# Patient Record
Sex: MALE | Race: WHITE | NOT HISPANIC OR LATINO | Employment: FULL TIME | URBAN - METROPOLITAN AREA
[De-identification: names, ages, dates, MRNs, and addresses within clinical notes are randomized per-mention and may not be internally consistent; named-entity substitution may affect disease eponyms.]

---

## 2021-03-23 ENCOUNTER — HOSPITAL ENCOUNTER (OUTPATIENT)
Dept: OTHER | Facility: HOSPITAL | Age: 51
Discharge: HOME | End: 2021-03-23
Attending: FAMILY MEDICINE

## 2021-03-23 ENCOUNTER — OFFICE VISIT CONVERTED (OUTPATIENT)
Dept: FAMILY MEDICINE CLINIC | Age: 51
End: 2021-03-23
Attending: FAMILY MEDICINE

## 2021-03-23 LAB
ALBUMIN SERPL-MCNC: 4.3 G/DL (ref 3.5–5)
ALBUMIN/GLOB SERPL: 1.5 {RATIO} (ref 1.4–2.6)
ALP SERPL-CCNC: 92 U/L (ref 53–128)
ALT SERPL-CCNC: 13 U/L (ref 10–40)
ANION GAP SERPL CALC-SCNC: 18 MMOL/L (ref 8–19)
APPEARANCE UR: CLEAR
AST SERPL-CCNC: 13 U/L (ref 15–50)
BACTERIA UR QL AUTO: ABNORMAL
BASOPHILS # BLD MANUAL: 0.05 10*3/UL (ref 0–0.2)
BASOPHILS NFR BLD MANUAL: 0.6 % (ref 0–3)
BILIRUB SERPL-MCNC: 0.18 MG/DL (ref 0.2–1.3)
BILIRUB UR QL: NEGATIVE
BUN SERPL-MCNC: 13 MG/DL (ref 5–25)
BUN/CREAT SERPL: 12 {RATIO} (ref 6–20)
CALCIUM SERPL-MCNC: 9.1 MG/DL (ref 8.7–10.4)
CASTS URNS QL MICRO: ABNORMAL /[LPF]
CHLORIDE SERPL-SCNC: 104 MMOL/L (ref 99–111)
CHOLEST SERPL-MCNC: 264 MG/DL (ref 107–200)
CHOLEST/HDLC SERPL: 7.1 {RATIO} (ref 3–6)
COLOR UR: YELLOW
CONV CO2: 24 MMOL/L (ref 22–32)
CONV LEUKOCYTE ESTERASE: NEGATIVE
CONV TOTAL PROTEIN: 7.1 G/DL (ref 6.3–8.2)
CONV UROBILINOGEN IN URINE BY AUTOMATED TEST STRIP: 0.2 {EHRLICHU}/DL (ref 0.1–1)
CREAT UR-MCNC: 1.13 MG/DL (ref 0.7–1.2)
DEPRECATED RDW RBC AUTO: 43.7 FL
EOSINOPHIL # BLD MANUAL: 0.17 10*3/UL (ref 0–0.7)
EOSINOPHIL NFR BLD MANUAL: 2.1 % (ref 0–7)
EPI CELLS #/AREA URNS HPF: ABNORMAL /[HPF]
ERYTHROCYTE [DISTWIDTH] IN BLOOD BY AUTOMATED COUNT: 12.1 % (ref 11.5–14.5)
GFR SERPLBLD BASED ON 1.73 SQ M-ARVRAT: >60 ML/MIN/{1.73_M2}
GLOBULIN UR ELPH-MCNC: 2.8 G/DL (ref 2–3.5)
GLUCOSE 24H UR-MCNC: NEGATIVE MG/DL
GLUCOSE SERPL-MCNC: 95 MG/DL (ref 70–99)
GRANS (ABSOLUTE): 4.69 10*3/UL (ref 2–8)
GRANS: 57.1 % (ref 30–85)
HBA1C MFR BLD: 15.6 G/DL (ref 14–18)
HCT VFR BLD AUTO: 47 % (ref 42–52)
HDLC SERPL-MCNC: 37 MG/DL (ref 40–60)
HGB UR QL STRIP: NEGATIVE
IMM GRANULOCYTES # BLD: 0.01 10*3/UL (ref 0–0.54)
IMM GRANULOCYTES NFR BLD: 0.1 % (ref 0–0.43)
KETONES UR QL STRIP: NEGATIVE MG/DL
LDLC SERPL CALC-MCNC: 152 MG/DL (ref 70–100)
LYMPHOCYTES # BLD MANUAL: 2.73 10*3/UL (ref 1–5)
LYMPHOCYTES NFR BLD MANUAL: 6.9 % (ref 3–10)
MAGNESIUM SERPL-MCNC: 2.28 MG/DL (ref 1.6–2.3)
MCH RBC QN AUTO: 32.2 PG (ref 27–31)
MCHC RBC AUTO-ENTMCNC: 33.2 G/DL (ref 33–37)
MCV RBC AUTO: 97.1 FL (ref 80–96)
MONOCYTES # BLD AUTO: 0.57 10*3/UL (ref 0.2–1.2)
MUCOUS THREADS URNS QL MICRO: ABNORMAL
NITRITE UR-MCNC: NEGATIVE MG/ML
OSMOLALITY SERPL CALC.SUM OF ELEC: 294 MOSM/KG (ref 273–304)
PH UR STRIP.AUTO: 7 [PH] (ref 5–8)
PLATELET # BLD AUTO: 205 10*3/UL (ref 130–400)
PMV BLD AUTO: 10.1 FL (ref 7.4–10.4)
POTASSIUM SERPL-SCNC: 4.3 MMOL/L (ref 3.5–5.3)
PROT UR-MCNC: NEGATIVE MG/DL
PSA SERPL-MCNC: 0.44 NG/ML (ref 0–4)
RBC # BLD AUTO: 4.84 10*6/UL (ref 4.7–6.1)
RBC # BLD AUTO: ABNORMAL /[HPF]
SODIUM SERPL-SCNC: 142 MMOL/L (ref 135–147)
SP GR UR STRIP: 1.02 (ref 1–1.03)
SPECIMEN SOURCE: ABNORMAL
TRIGL SERPL-MCNC: 377 MG/DL (ref 40–150)
TSH SERPL-ACNC: 1.72 M[IU]/L (ref 0.27–4.2)
UNIDENT CRYS URNS QL MICRO: ABNORMAL /[HPF]
VARIANT LYMPHS NFR BLD MANUAL: 33.2 % (ref 20–45)
VLDLC SERPL-MCNC: 75 MG/DL (ref 5–37)
WBC # BLD AUTO: 8.22 10*3/UL (ref 4.8–10.8)
WBC #/AREA URNS HPF: ABNORMAL /[HPF]

## 2021-03-29 ENCOUNTER — CONVERSION ENCOUNTER (OUTPATIENT)
Dept: CARDIOLOGY | Facility: CLINIC | Age: 51
End: 2021-03-29
Attending: INTERNAL MEDICINE

## 2021-04-05 ENCOUNTER — OFFICE VISIT CONVERTED (OUTPATIENT)
Dept: CARDIOLOGY | Facility: CLINIC | Age: 51
End: 2021-04-05
Attending: INTERNAL MEDICINE

## 2021-04-23 ENCOUNTER — CONVERSION ENCOUNTER (OUTPATIENT)
Dept: CARDIOLOGY | Facility: CLINIC | Age: 51
End: 2021-04-23
Attending: INTERNAL MEDICINE

## 2021-05-10 NOTE — PROCEDURES
Procedure Note      Patient Name: Francis Coffman   Patient ID: [id number]   Sex: Male   YOB: 1970    Primary Care Provider: Santana Hays MD   Referring Provider: Santana Hays MD    Visit Date: March 29, 2021    Provider: Gabe Mills MD   Location: [facility]   Location Address: [address] [place] [address]  [id number]   Location Phone: [phone]          FINAL REPORT   HOLTER MONITOR REPORT  Date: 03/24/2021   Indication: Tachycardia      24-HOUR HOLTER MONITOR       FINDINGS:   The patient underwent a 24-hour Holter monitor.  The average heart rate was 58 beats per minute.  The maximum heart rate was 99 beats per minute.  The minimum heart rate was 44 beats per minute.  The baseline rhythm was normal sinus rhythm. The patient had one episode of PVC. No other dysrhythmias were noted.     IMPRESSION:     1.  Normal average heart rate of 58 beats per minute.    2.  One PVC noted.      Gabe Mills MD  /pap                   Electronically Signed by: Joan Esquivel-, Other -Author on March 31, 2021 01:48:15 PM  Electronically Co-signed by: Gabe Mills MD -Reviewer on March 31, 2021 05:23:15 PM

## 2021-05-11 NOTE — PROCEDURES
"   Procedure Note      Patient Name: Francis Coffman   Patient ID: [id number]   Sex: Male   YOB: 1970    Primary Care Provider: Santana Hays MD   Referring Provider: Santana Hays MD    Visit Date: April 23, 2021    Provider: Ulises Arteaga MD   Location: [facility]   Location Address: [address] [place] [address], [address]   JOCELYNE Bridges  [id number]   Location Phone: [phone]          FINAL REPORT   TRANSTHORACIC ECHOCARDIOGRAM REPORT    Diagnosis: Palpitations   Height: 5'10\" Weight: 183 B/P: 122/77 BSA: 2.0   Tech: BNS   MEASUREMENTS:  RVID (Diastole) : RVID. (NORMAL: 0.7 to 2.4 cm max)   LVID (Systole): 3.7 cm (Diastole): 5.2 cm . (NORMAL: 3.7 - 5.4 cm)   Posterior Wall Thickness (Diastole): 0.9 cm. (NORMAL: 0.8 - 1.1 cm)   Septal Thickness (Diastole): 0.9 cm. (NORMAL: 0.7 - 1.2 cm)   LAID (Systole): 3.8 cm. (NORMAL: 1.9 - 3.8 cm)   Aortic Root Diameter (Diastole): 3.2 cm. (NORMAL: 2.0 - 3.7 cm)   COMMENTS:  The patient underwent 2-D, M-Mode, and Doppler examination, including pulse-wave, continuous-wave, and color-flow analysis; the study is technically adequate.   FINDINGS:  MITRAL VALVE: Normal in structure. Trace mitral regurgitation.   AORTIC VALVE: Normal in structure and function.   TRICUSPID VALVE: Normal in structure. Mild tricuspid regurgitation. No evidence of pulmonary hypertension.   PULMONIC VALVE: Not well visualized on this study.   AORTIC ROOT: Normal in size with adequate motion.   LEFT ATRIUM: Normal in size. No intracavitary masses or clots seen. LA volume index is 17 mL/m2.   LEFT VENTRICLE: The left ventricular chamber size is normal. The left ventricular wall thickness is normal. Left ventricular systolic function is normal. Estimated LV ejection fraction is 55-60%. The diastolic function parameters are normal.   RIGHT VENTRICLE: Normal size and function.   RIGHT ATRIUM: Normal in size.   PERICARDIUM: No evidence of pericardial effusion.   INFERIOR VENA CAVA: " Measures 1.6 cm.   DOPPLER: E/A ratio is 1.6. DT= 197 msec. IVRT is 60 msec. E/E' is 7.   Faxed: 04/27/2021      CONCLUSION:  1.  Normal biventricular systolic function. Ejection fraction 55-60%.   2.  Trace mitral and mild tricuspid regurgitation.   3.  Normal diastolic function.      REUBEN Arteaga MD   CBD/pap                 Electronically Signed by: Joan Esquivel-, Other -Author on April 27, 2021 01:14:50 PM  Electronically Co-signed by: Ulises Arteaga MD -Reviewer on April 27, 2021 02:50:52 PM

## 2021-05-11 NOTE — H&P
History and Physical      Patient Name: Francis Coffman   Patient ID: [id number]   Sex: Male   YOB: 1970    Primary Care Provider: Santana Hays MD   Referring Provider: Santana Hays MD    Visit Date: April 5, 2021    Provider: Ulises Arteaga MD   Location: [facility]   Location Address: [address] [place] [address]  [id number]          History Of Present Illness  Consult requested by: Santana Hays MD   Francis Coffman is a 50 year old /White male who has no previous cardiac history. On 03/10 the patient had sudden onset of palpitations, subjective tachycardia during normal activity. He is a [employer]  and he worked during this hour that he was feeling his heart racing. He recorded it on his I-phone and it appears to be a narrow complex tachycardia rate about 188 to 190, it seems to be regular on the tracings that he recorded. It went away spontaneously and has not recurred since then. He denies any previous history of palpitations. He denies any change in diet or fluid intake. No over-the-counter medications or any other change in his general health status. His baseline EKG in the primary care office showed sinus rhythm, rate 65, normal EKG. He had a Holter monitor since then which also showed no significant arrhythmias.   PAST MEDICAL HISTORY: Negative for chronic medical conditions.   PSYCHOSOCIAL HISTORY: Moderate use of alcohol. Currently smokes one pack per day.   FAMILY HISTORY: No significant family history.   CURRENT MEDICATIONS: Metoprolol Succinate 25 mg 1/2 daily.   ALLERGIES: No known drug allergies.       Review of Systems  · Constitutional  o Admits  o : good general health lately  o Denies  o : fatigue, recent weight changes   · Eyes  o Denies  o : double vision  · HENT  o Denies  o : hearing loss or ringing, chronic sinus problem, swollen glands in neck  · Cardiovascular  o Admits  o : palpitations (fast, fluttering, or  "skipping beats)  o Denies  o : chest pain, swelling (feet, ankles, hands), shortness of breath while walking or lying flat  · Respiratory  o Denies  o : chronic or frequent cough, asthma or wheezing, COPD  · Gastrointestinal  o Denies  o : ulcers, nausea or vomiting  · Neurologic  o Denies  o : lightheaded or dizzy, stroke, headaches  · Musculoskeletal  o Denies  o : joint pain, back pain  · Endocrine  o Denies  o : thyroid disease, diabetes, heat or cold intolerance, excessive thirst or urination  · Heme-Lymph  o Denies  o : bleeding or bruising tendency, anemia      Vitals  Date Time BP Position Site L\R Cuff Size HR RR TEMP (F) WT  HT  BMI kg/m2 BSA m2 O2 Sat FR L/min FiO2 HC       04/05/2021 12:24 /77 Sitting    66 - R   183lbs 0oz 5'  10\" 26.26 2.02             Physical Examination  · Constitutional  o Appearance  o : This is a normal white male, pleasant, no acute distress.  · Head and Face  o HEENT  o : No pallor, anicteric. Eyes normal. Moist mucous membranes.  · Neck  o Inspection/Palpation  o : Supple.   o Jugular Veins  o : No JVD. No carotid bruits.  · Respiratory  o Auscultation of Lungs  o : Clear to auscultation bilaterally. No crackles or wheezing.  · Cardiovascular  o Heart  o : S1, S2 is normally heard. No S3. No murmur, rubs, or gallops.  · Gastrointestinal  o Abdominal Examination  o : Soft, non-distended. No palpable hepatosplenomegaly. Bowel sounds heard in all four quadrants.  · Musculoskeletal  o General  o : Normal muscle tone and strength.  · Skin and Subcutaneous Tissue  o General Inspection  o : No skin rashes.  · Extremities  o Extremities  o : Warm and well perfused. Distal pulses present. No pitting pedal edema.  · EKG  o EKG  o : Baseline EKG was reviewed as described above.  · Labs  o Labs  o : CBC is normal. CMP is normal. TSH is normal.   · Diagnostic Testing  o Diagnostic Testing  o : Holter monitor was reviewed.  · Data  o Data  o : Primary care records reviewed. "           Assessment     1.  Recent episode of SVT - his episodes lasted approximately 1 hour and it was recorded fairly well on his I-phone. This showed a narrow complex tachycardia with a rate of 188 to 190. He was completely stable during this time and actually continued working during the event. He has not had any recurrent symptoms. His Holter monitor and baseline EKG were otherwise normal.   2.  Smoking.         Plan     I counseled Francis for about 5 minutes on quitting smoking. He understands the risks. I think this is a good time for him to try to quit when he is relatively young and he can benefit from a lot of risk reduction over the years if he quits now. He is going to give this some thought but is not ready to pick a quick date. At this time, this is only episode of arrhythmia he has had, I think I will take him off the low dose beta blocker since it is unclear this is going to be a pattern of symptoms. Echocardiogram will be obtained to evaluate for any structural abnormalities. We will do a follow-up with him later but in the meantime, if he has recurrent subjective tachycardia further event monitoring may be useful in definitively recording this other than on his cell phone. We discussed if he has recurrent SVT with no other specific underlying cause. We can consider more consistent medical therapy, and in recurrent/refractory cases, catheter ablation can be considered. He is agreeable with this plan. He will call if he has other episodes that occur, otherwise, given his clinical stability during the episode I have cleared him to continue doing his normal duties as a .         REUBEN Arteaga MD  CBD/                Electronically Signed by: Fifi Mcintyre-, OT -Author on April 14, 2021 07:33:11 AM  Electronically Co-signed by: Ulises Arteaga MD -Reviewer on April 14, 2021 09:56:32 AM

## 2021-05-14 VITALS
WEIGHT: 183 LBS | HEART RATE: 66 BPM | BODY MASS INDEX: 26.2 KG/M2 | SYSTOLIC BLOOD PRESSURE: 122 MMHG | HEIGHT: 70 IN | DIASTOLIC BLOOD PRESSURE: 77 MMHG

## 2021-05-18 NOTE — PROGRESS NOTES
Francis Coffman  1970     Office/Outpatient Visit    Visit Date: [place], Mar 23, 2021 02:19 pm    Provider: Santana Hays MD (Assistant: Samantha Hicks,  )    Location: [facility]        Electronically signed by Santana Hays MD on  03/24/2021 08:50:06 AM                             Subjective:        CC: physical exam, elevated heart rate    HPI:       Fred is in today for wellness exam.  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  He does smoke but less than a pack daily.  He uses only a small amount of alcohol.  He is in good overall health.  He is not aware of any cancers that run in the family.        Fred did have an episode of tachycardia on March 10.  This lasted approximately an hour.  His heart rate was up close to 200 during this.  He did not have shortness of breath or chest pain.  He did not feel like he might pass out.  He felt like his 'heart would be out of his chest'.  This resolved spontaneously.  He has not had this since.  He does have some recording of it on his iPhone.  It does look like a narrow complex tachycardia.    ROS:     CONSTITUTIONAL:  Negative for chills and fever.      CARDIOVASCULAR:  Negative for chest pain and palpitations.      RESPIRATORY:  Negative for recent cough and dyspnea.      GASTROINTESTINAL:  Negative for abdominal pain, nausea and vomiting.      INTEGUMENTARY:  Negative for atypical mole(s) and rash.          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          Alcohol: Frequency:    'I might have 2-3 beers on the weekend';         Substance Abuse History:     Last Reviewed on 3/23/2021 02:32 PM by Santana Hays    NEGATIVE         Mental Health History:     Last Reviewed on 3/23/2021 02:32 PM by Santana Hays        Communicable Diseases (eg STDs):     Last Reviewed on 3/23/2021 02:32 PM by Santana Hays        Current Problems:     Last Reviewed on 3/23/2021 02:32 PM by Santana Hays    None Recorded        Immunizations:     PPD 8/2/2013        Allergies:     Last Reviewed on 3/23/2021 02:32 PM by Santana Hays    No Known Allergies.        Current Medications:     Last Reviewed on 3/23/2021 02:32 PM by Santana Hays    None        Objective:        Vitals:         Current: 3/23/2021 2:25:38 PM    Ht:  5 ft, 9.25 in;  Wt: 181.6 lbs;  BMI: 26.6T: 97.4 F (temporal);  BP: 136/68 mm Hg (left arm, sitting);  P: 74 bpm (left arm (BP Cuff), sitting);  sCr: 0.93 mg/dL;  GFR: 92.42        Exams:     PHYSICAL EXAM:     GENERAL: Vitals recorded well developed, well nourished;     EYES: extraocular movements intact; conjunctiva and cornea are normal; PERRL;     E/N/T: EARS:  normal external auditory canals and tympanic membranes;  grossly normal hearing; OROPHARYNX:  normal mucosa, dentition, gingiva, and posterior pharynx;     NECK: range of motion is normal; thyroid is non-palpable;     RESPIRATORY: normal respiratory rate and pattern with no distress; normal breath sounds with no rales, rhonchi, wheezes or rubs;     CARDIOVASCULAR: normal rate; rhythm is regular;  no systolic murmur;     GASTROINTESTINAL: nontender; normal bowel sounds; no masses;     LYMPHATIC: no enlargement of cervical or facial nodes; no supraclavicular nodes;     SKIN:   no significant rashes or lesions; no suspicious moles;     NEUROLOGIC: mental status: alert and oriented x 3; cranial nerves II-XII grossly intact;     PSYCHIATRIC: appropriate affect and demeanor; normal psychomotor function;         Lab/Test Results:         LABORATORY RESULTS: EKG performed by pr         Assessment:         Z00.00   Encounter for general adult medical examination without abnormal findings       R00.0   Tachycardia, unspecified           ORDERS:         Meds Prescribed:       [New Rx] metoprolol succinate 25 mg oral Tablet, Extended Release 24 hr [take 1/2 tablet (12.5 mg) by oral route once daily], #30 (thirty) tablets, Refills: 0 (zero)         Radiology/Test Orders:       [id number]  Electrocardiogram, routine with at least 12 leads; with interpretation and report  (In-House)            [id number]  Holter monitor connection and disconnection  (Send-Out)    pt will come to have holter placed when one is available./pr          Lab Orders:       [id number]  MG - [facility] Magnesium, Serum  (Send-Out)            65055  PHYSM - [facility] MALE PHYSICAL: CMP, CBC,LIPID, PRSAS-, TSH 37029, 45054, 98861, 93969, , 26970  (Send-Out)            [id number]  BDUAM - [facility] Urinalysis, automated, with micro  (Send-Out)              Procedures Ordered:       REFER  Referral to Specialist or Other Facility  (Send-Out)              Other Orders:         Depression screen negative  (In-House)                      Plan:         Encounter for general adult medical examination without abnormal findings    LABORATORY:  Labs ordered to be performed today include Magnesium level, MALE PHYSICAL: CMP, CBC, LIPID, PSA, TSH, and urinalysis with micro.      RECOMMENDATIONS given include: Fred is in good overall health.  I am concerned again by how fast the heart rate was during this episode.  We will evaluate him further as noted below.  In addition, I'm going to touch him with a very low dose of beta blocker in the near term.  We will make  referral to cardiology as well..  [facility] PHQ-9 Depression Screening: Completed form scanned and in chart; Total Score 1; Negative Depression Screen           Orders:       [id number]  MG - [facility] Magnesium, Serum  (Send-Out)            56583  PHYSM - [facility] MALE PHYSICAL: CMP, CBC,LIPID, PRSAS-, TSH 16574, 32422, 86685, 24444, , 45355  (Send-Out)            16327  BDUAM - [facility] Urinalysis, automated, with micro  (Send-Out)              Depression screen negative  (In-House)              Tachycardia, unspecifiedAs above.        TESTS/PROCEDURES:  Will proceed with an ECG and Holter Monitor 24 hour to be performed/scheduled now.      REFERRALS:  Referral initiated to a cardiologist.            Prescriptions:       [New Rx] metoprolol succinate 25 mg oral Tablet, Extended Release 24 hr [take 1/2 tablet (12.5 mg) by oral route once daily], #30 (thirty) tablets, Refills: 0 (zero)           Orders:       [id number]  Electrocardiogram, routine with at least 12 leads; with interpretation and report  (In-House)            40213  Holter monitor connection and disconnection  (Send-Out)    pt will come to have holter placed when one is available./pr        REFER  Referral to Specialist or Other Facility  (Send-Out)                Patient Education Handouts:       Paroxysmal Supraventricular Tachycardia (PSVT)              Charge Capture:         Primary Diagnosis:     Z00.00  Encounter for general adult medical examination without abnormal findings           Orders:      [id number]  Preventive medicine, new patient, age 40-64 years  (In-House)              Depression screen negative  (In-House)              R00.0  Tachycardia, unspecified           Orders:      [id number]  Electrocardiogram, routine with at least 12 leads; with interpretation and report  (In-House)

## 2021-07-02 VITALS
BODY MASS INDEX: 26.9 KG/M2 | SYSTOLIC BLOOD PRESSURE: 136 MMHG | HEART RATE: 74 BPM | TEMPERATURE: 97.4 F | DIASTOLIC BLOOD PRESSURE: 68 MMHG | WEIGHT: 181.6 LBS | HEIGHT: 69 IN

## 2021-10-11 ENCOUNTER — OFFICE VISIT (OUTPATIENT)
Dept: CARDIOLOGY | Facility: CLINIC | Age: 51
End: 2021-10-11

## 2021-10-11 VITALS
HEIGHT: 70 IN | HEART RATE: 56 BPM | BODY MASS INDEX: 25.34 KG/M2 | DIASTOLIC BLOOD PRESSURE: 68 MMHG | SYSTOLIC BLOOD PRESSURE: 111 MMHG | WEIGHT: 177 LBS

## 2021-10-11 DIAGNOSIS — I47.1 PAROXYSMAL SVT (SUPRAVENTRICULAR TACHYCARDIA) (HCC): Primary | ICD-10-CM

## 2021-10-11 PROCEDURE — 99213 OFFICE O/P EST LOW 20 MIN: CPT | Performed by: INTERNAL MEDICINE

## 2021-10-11 PROCEDURE — 99406 BEHAV CHNG SMOKING 3-10 MIN: CPT | Performed by: INTERNAL MEDICINE

## 2021-10-11 NOTE — PROGRESS NOTES
"Chief Complaint  6 MO F/U (PALPATATIONS)    Subjective            Francis Coffman presents to [facility] CARDIOLOG[facility]  History of Present Illness    Francis is here for follow-up of one episode of SVT.  I last saw him in April.  He has not had any further recurrent symptoms since that time.  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    PMH  History reviewed. No pertinent past medical history.      SURGICALHX  History reviewed. No pertinent surgical history.     SOC  Social History     Socioeconomic History   • Marital status:    Tobacco Use   • Smoking status: Current Every Day Smoker     Packs/day: 0.50   • Smokeless tobacco: Never Used   Vaping Use   • Vaping Use: Never used   Substance and Sexual Activity   • Alcohol use: Yes   • Drug use: Never   • Sexual activity: Defer         FAMHX  Family History   Problem Relation Age of Onset   • No Known Problems Mother    • No Known Problems Father           ALLERGY  No Known Allergies     MEDSCURRENT  No current outpatient medications on file.      Review of Systems   Cardiovascular: Negative for chest pain, dyspnea on exertion, irregular heartbeat and palpitations.        Objective     /68   Pulse 56   Ht 177.8 cm (70\")   Wt 80.3 kg (177 lb)   BMI 25.40 kg/m²       General Appearance:   · well developed  · well nourished  HENT:   · oropharynx moist  · lips not cyanotic  Neck:  · thyroid not enlarged  · supple  Respiratory:  · no respiratory distress  · normal breath sounds  · no rales  Cardiovascular:  · no jugular venous distention  · regular rhythm  · apical impulse normal  · S1 normal, S2 normal  · no S3, no S4   · no murmur  · no rub, no thrill  · carotid pulses normal; no bruit  · pedal pulses normal  · lower extremity edema: none        Result Review :                  Procedures       Francis Coffman  reports that he has been smoking. He has been smoking about 0.50 packs per day. He has never used " smokeless tobacco.. I have educated him on the risk of diseases from using tobacco products such as cancer, COPD and heart disease.     I advised him to quit and he is willing to quit. We have discussed the following method/s for tobacco cessation:  Counseling.  Together we have set a quit date for When he weans down to 0 cigarettes.  He will follow up with me in several months or sooner to check on his progress.    I spent 3  minutes counseling the patient.                Assessment and Plan        ASSESSMENT:  Encounter Diagnosis   Name Primary?   • Paroxysmal SVT (supraventricular tachycardia) (HCC) Yes         PLAN:    1.  Francis has not had any recurrent symptoms.  He only had 1 episode of stable SVT previously.  His echo showed no structural abnormalities.  2.  We will continue a watchful waiting approach, no medication is recommended at this time.  3.  Smoking cessation recommended    He will be followed as needed          Patient was given instructions and counseling regarding his condition or for health maintenance advice. Please see specific information pulled into the AVS if appropriate.             RADHA Arteaga MD  10/11/2021    12:00 EDT

## 2025-08-05 ENCOUNTER — HOSPITAL ENCOUNTER (EMERGENCY)
Facility: HOSPITAL | Age: 55
Discharge: HOME | End: 2025-08-05
Attending: STUDENT IN AN ORGANIZED HEALTH CARE EDUCATION/TRAINING PROGRAM | Admitting: STUDENT IN AN ORGANIZED HEALTH CARE EDUCATION/TRAINING PROGRAM
Payer: OTHER MISCELLANEOUS

## 2025-08-05 ENCOUNTER — APPOINTMENT (OUTPATIENT)
Dept: CT IMAGING | Facility: HOSPITAL | Age: 55
End: 2025-08-05
Payer: OTHER MISCELLANEOUS

## 2025-08-05 VITALS
OXYGEN SATURATION: 94 % | DIASTOLIC BLOOD PRESSURE: 87 MMHG | TEMPERATURE: 98.2 F | SYSTOLIC BLOOD PRESSURE: 124 MMHG | HEIGHT: 70 IN | WEIGHT: 182 LBS | RESPIRATION RATE: 16 BRPM | BODY MASS INDEX: 26.05 KG/M2 | HEART RATE: 86 BPM

## 2025-08-05 DIAGNOSIS — M54.50 ACUTE MIDLINE LOW BACK PAIN WITHOUT SCIATICA: Primary | ICD-10-CM

## 2025-08-05 PROCEDURE — 99284 EMERGENCY DEPT VISIT MOD MDM: CPT | Performed by: STUDENT IN AN ORGANIZED HEALTH CARE EDUCATION/TRAINING PROGRAM

## 2025-08-05 PROCEDURE — 72131 CT LUMBAR SPINE W/O DYE: CPT

## 2025-08-05 PROCEDURE — 99283 EMERGENCY DEPT VISIT LOW MDM: CPT | Performed by: STUDENT IN AN ORGANIZED HEALTH CARE EDUCATION/TRAINING PROGRAM

## 2025-08-05 PROCEDURE — 25010000002 KETOROLAC TROMETHAMINE PER 15 MG: Performed by: STUDENT IN AN ORGANIZED HEALTH CARE EDUCATION/TRAINING PROGRAM

## 2025-08-05 PROCEDURE — 96372 THER/PROPH/DIAG INJ SC/IM: CPT

## 2025-08-05 RX ORDER — KETOROLAC TROMETHAMINE 30 MG/ML
30 INJECTION, SOLUTION INTRAMUSCULAR; INTRAVENOUS ONCE
Status: COMPLETED | OUTPATIENT
Start: 2025-08-05 | End: 2025-08-05

## 2025-08-05 RX ORDER — CYCLOBENZAPRINE HCL 5 MG
5 TABLET ORAL 3 TIMES DAILY PRN
Qty: 15 TABLET | Refills: 0 | Status: SHIPPED | OUTPATIENT
Start: 2025-08-05

## 2025-08-05 RX ORDER — NAPROXEN 500 MG/1
500 TABLET ORAL 2 TIMES DAILY PRN
Qty: 30 TABLET | Refills: 0 | Status: SHIPPED | OUTPATIENT
Start: 2025-08-05

## 2025-08-05 RX ADMIN — KETOROLAC TROMETHAMINE 30 MG: 30 INJECTION, SOLUTION INTRAMUSCULAR at 11:28
